# Patient Record
Sex: FEMALE | Race: WHITE | NOT HISPANIC OR LATINO | Employment: OTHER | URBAN - METROPOLITAN AREA
[De-identification: names, ages, dates, MRNs, and addresses within clinical notes are randomized per-mention and may not be internally consistent; named-entity substitution may affect disease eponyms.]

---

## 2023-09-08 PROBLEM — E53.8 VITAMIN B12 DEFICIENCY: Status: ACTIVE | Noted: 2023-09-08

## 2023-09-08 PROBLEM — E55.9 VITAMIN D DEFICIENCY: Status: ACTIVE | Noted: 2023-09-08

## 2023-09-08 PROBLEM — N82.3 RECTOVAGINAL FISTULA: Status: ACTIVE | Noted: 2023-09-08

## 2023-09-08 PROBLEM — K50.90 CROHN'S DISEASE (MULTI): Status: ACTIVE | Noted: 2023-09-08

## 2023-09-08 PROBLEM — B00.1 RECURRENT HERPES LABIALIS: Status: ACTIVE | Noted: 2023-09-08

## 2023-09-08 PROBLEM — B37.31 VAGINAL CANDIDIASIS: Status: ACTIVE | Noted: 2023-09-08

## 2023-09-08 PROBLEM — K43.9 ABDOMINAL WALL HERNIA: Status: ACTIVE | Noted: 2023-09-08

## 2023-09-08 RX ORDER — MULTIVITAMIN
1 TABLET ORAL DAILY
COMMUNITY
Start: 2017-02-01 | End: 2023-10-12 | Stop reason: WASHOUT

## 2023-09-08 RX ORDER — CARVEDILOL 12.5 MG/1
1 TABLET ORAL 2 TIMES DAILY
COMMUNITY
Start: 2017-02-01

## 2023-09-08 RX ORDER — FENOFIBRATE 160 MG/1
1 TABLET ORAL DAILY
COMMUNITY
End: 2023-10-12 | Stop reason: WASHOUT

## 2023-09-08 RX ORDER — CYANOCOBALAMIN 1000 UG/ML
1000 INJECTION, SOLUTION INTRAMUSCULAR; SUBCUTANEOUS
COMMUNITY
Start: 2014-10-08

## 2023-09-08 RX ORDER — LOPERAMIDE HCL 2 MG
TABLET ORAL
COMMUNITY
Start: 2015-04-15 | End: 2023-10-12 | Stop reason: WASHOUT

## 2023-09-08 RX ORDER — ALBUTEROL SULFATE 90 UG/1
AEROSOL, METERED RESPIRATORY (INHALATION)
COMMUNITY
Start: 2013-10-23 | End: 2023-10-12 | Stop reason: WASHOUT

## 2023-09-08 RX ORDER — POTASSIUM CHLORIDE 1125 MG/1
15 TABLET, EXTENDED RELEASE ORAL 2 TIMES DAILY
COMMUNITY
End: 2023-10-12 | Stop reason: WASHOUT

## 2023-09-08 RX ORDER — USTEKINUMAB 45 MG/.5ML
90 INJECTION, SOLUTION SUBCUTANEOUS
COMMUNITY
End: 2023-10-12 | Stop reason: WASHOUT

## 2023-09-08 RX ORDER — OXAZEPAM 15 MG/1
CAPSULE ORAL EVERY 24 HOURS
COMMUNITY
Start: 2015-04-17 | End: 2023-10-12 | Stop reason: WASHOUT

## 2023-09-08 RX ORDER — FAMCICLOVIR 250 MG/1
2 TABLET ORAL 3 TIMES DAILY
COMMUNITY
Start: 2020-06-02

## 2023-09-08 RX ORDER — TRAZODONE HYDROCHLORIDE 100 MG/1
TABLET ORAL
COMMUNITY
Start: 2015-04-17 | End: 2023-10-12 | Stop reason: WASHOUT

## 2023-09-08 RX ORDER — ALLOPURINOL 100 MG/1
TABLET ORAL
COMMUNITY
Start: 2019-03-09 | End: 2024-04-04

## 2023-09-08 RX ORDER — FLUCONAZOLE 200 MG/1
TABLET ORAL
COMMUNITY
Start: 2015-11-30

## 2023-09-08 RX ORDER — OXYBUTYNIN CHLORIDE 10 MG/1
1 TABLET, EXTENDED RELEASE ORAL DAILY
COMMUNITY
End: 2023-10-12 | Stop reason: WASHOUT

## 2023-09-08 RX ORDER — DONEPEZIL HYDROCHLORIDE 10 MG/1
1 TABLET, FILM COATED ORAL DAILY
COMMUNITY
End: 2023-10-12 | Stop reason: WASHOUT

## 2023-09-08 RX ORDER — SIMETHICONE 125 MG
CAPSULE ORAL 2 TIMES DAILY
COMMUNITY
End: 2023-10-12 | Stop reason: WASHOUT

## 2023-09-08 RX ORDER — MERCAPTOPURINE 50 MG/1
TABLET ORAL
COMMUNITY
Start: 2015-02-18

## 2023-09-08 RX ORDER — CHLORTHALIDONE 25 MG/1
1 TABLET ORAL DAILY
COMMUNITY
End: 2023-10-12 | Stop reason: WASHOUT

## 2023-09-08 RX ORDER — OMEPRAZOLE 40 MG/1
CAPSULE, DELAYED RELEASE ORAL
COMMUNITY
Start: 2013-10-23

## 2023-09-08 RX ORDER — FENOFIBRATE 134 MG/1
1 CAPSULE ORAL DAILY
COMMUNITY
Start: 2013-10-23 | End: 2023-10-12 | Stop reason: WASHOUT

## 2023-09-08 RX ORDER — RIZATRIPTAN BENZOATE 10 MG/1
TABLET ORAL
COMMUNITY
Start: 2013-10-23

## 2023-09-08 RX ORDER — BUPROPION HYDROCHLORIDE 300 MG/1
1 TABLET ORAL DAILY
COMMUNITY

## 2023-09-08 RX ORDER — DULOXETIN HYDROCHLORIDE 60 MG/1
CAPSULE, DELAYED RELEASE ORAL
COMMUNITY
Start: 2018-06-18 | End: 2023-10-12 | Stop reason: WASHOUT

## 2023-10-12 ENCOUNTER — OFFICE VISIT (OUTPATIENT)
Dept: GASTROENTEROLOGY | Facility: CLINIC | Age: 53
End: 2023-10-12
Payer: MEDICARE

## 2023-10-12 VITALS
HEIGHT: 67 IN | SYSTOLIC BLOOD PRESSURE: 125 MMHG | DIASTOLIC BLOOD PRESSURE: 83 MMHG | BODY MASS INDEX: 32.68 KG/M2 | HEART RATE: 71 BPM | TEMPERATURE: 97.3 F | WEIGHT: 208.2 LBS

## 2023-10-12 DIAGNOSIS — K50.813 CROHN'S DISEASE OF BOTH SMALL AND LARGE INTESTINE WITH FISTULA (MULTI): Primary | ICD-10-CM

## 2023-10-12 PROCEDURE — 99214 OFFICE O/P EST MOD 30 MIN: CPT | Performed by: INTERNAL MEDICINE

## 2023-10-12 RX ORDER — OXAZEPAM 10 MG/1
10 CAPSULE ORAL
COMMUNITY
Start: 2023-10-10

## 2023-10-12 ASSESSMENT — PAIN SCALES - GENERAL: PAINLEVEL: 0-NO PAIN

## 2023-10-12 NOTE — PROGRESS NOTES
"Follow-up for 53 year old woman with ileal, colonic, and perianal Crohn's disease diagnosed around age 20.     S/P 4 ileocolic resections for progressive obstructive disease 1989, 1995, 2001 and 8/2012.    Currently on Stelara (2/2018) and 6MP 50 mg/allopurinol 100 mg four days per week. Overall improved and generally well on therapy.     (+) h/o rectovaginal fistula in past s/p repair with c/o intermittent air per vagina. Presented early 3/2018 with abscess in RV septum--> EUA with I&D of abscess(Bartholin cyst); seton placed 3/8/2019.    7/2021 Anser UST 2.7 with no antibodies    10/2021 colonoscopy without any evident disease; anastomosis OK.  No colonic disease.  (+) chronic perianal fistula.      11/21 had ventral hernia repair with mesh placed at [facility]    10/2022.  some increased stress and diarrhea.  Stools were up to 4-6 daily from her baseline of 1-2 daily.  Stelara every 8 weeks and 6-mercaptopurine/allopurinol 4 days/wk with loperamide 2 tablets twice daily.    4/2023 FCP=66    In follow-up today, overall doing OK.  Occasionally if eats the wrong thing, peanuts for example, will get loose stool.  Most days stools 2-3 daily and softly formed.  Continues to note gas per fistula. If stool too loose, may have some discharge from fistula.    She is going to get pelvic floor physical therapy.  She has urinary incontinence and rare fecal urgency with some leaking.      Complete review of systems otherwise negative per complaint.  IBD EIMs (-)    /83   Pulse 71   Temp 36.3 °C (97.3 °F)   Ht 1.689 m (5' 6.5\")   Wt 94.4 kg (208 lb 3.2 oz)   BMI 33.10 kg/m²   Vital signs reviewed  Patient alert and oriented in no acute distress  Anicteric  No cervical adenopathy  Cardiac exam regular rate and rhythm S1-S2 without murmurs gallops or rubs  Lungs clear to auscultation bilaterally  Abdomen soft and nontender without organomegaly or mass.  No rebound or guarding.  Bowel sounds present  Extremities without " edema    A/P:  Crohn's disease-she has had ileal, colonic, and perianal fistulas and Crohn's disease.  Currently disease is stable on Stelara every 8 weeks along with 6-mercaptopurine/allopurinol 1 of each 4 days/week.  Labs are normal taken last week and she feels well with stable bowel habits.  We will continue with current treatment without change.  We will monitor labs every 6 months since they have been stable.  We will ask her to follow-up virtually in 6 months and follow-up in the office in 1 year    Rectovaginal fistula-this is chronic and mildly symptomatic intermittently, particular when stool is loose.  There is no medical therapy for healing this.  She is not interested in any surgical intervention at this time, but may consider this some point in the future.  If she wants to pursue this, she can follow-up with Dr. Pieter Hernandez.

## 2024-01-29 DIAGNOSIS — K50.813 CROHN'S DISEASE OF BOTH SMALL AND LARGE INTESTINE WITH FISTULA (MULTI): Primary | ICD-10-CM

## 2024-02-12 DIAGNOSIS — R30.0 DYSURIA: Primary | ICD-10-CM

## 2024-02-12 DIAGNOSIS — K50.813 CROHN'S DISEASE OF BOTH SMALL AND LARGE INTESTINE WITH FISTULA (MULTI): Primary | ICD-10-CM

## 2024-02-12 RX ORDER — USTEKINUMAB 90 MG/ML
90 INJECTION, SOLUTION SUBCUTANEOUS
COMMUNITY
Start: 2023-12-11 | End: 2024-02-12 | Stop reason: SDUPTHER

## 2024-02-12 RX ORDER — CIPROFLOXACIN 500 MG/1
500 TABLET ORAL 2 TIMES DAILY
Qty: 10 TABLET | Refills: 0 | Status: SHIPPED | OUTPATIENT
Start: 2024-02-12 | End: 2024-02-17

## 2024-02-12 RX ORDER — USTEKINUMAB 45 MG/.5ML
90 INJECTION, SOLUTION SUBCUTANEOUS
Qty: 1 ML | Refills: 5 | Status: SHIPPED | OUTPATIENT
Start: 2024-02-12 | End: 2024-02-14 | Stop reason: CLARIF

## 2024-02-12 NOTE — TELEPHONE ENCOUNTER
Ivanna called the office. Was treated with 2 antibiotics for pneumonia. This caused frequent loose stools and fistula drainage. Now has UTI symptoms due to drainage from fistula.     I discussed the patient with Dr. Ozuna. He developed the following plan.       THE PATIENT IS AWARE OF THE PLAN ABOVE AND IS IN AGREEMENT

## 2024-02-14 DIAGNOSIS — K50.813 CROHN'S DISEASE OF BOTH SMALL AND LARGE INTESTINE WITH FISTULA (MULTI): Primary | ICD-10-CM

## 2024-04-03 DIAGNOSIS — K50.813 CROHN'S DISEASE OF BOTH SMALL AND LARGE INTESTINE WITH FISTULA (MULTI): Primary | ICD-10-CM

## 2024-04-04 RX ORDER — ALLOPURINOL 100 MG/1
TABLET ORAL
Qty: 60 TABLET | Refills: 3 | Status: SHIPPED | OUTPATIENT
Start: 2024-04-04

## 2024-04-17 NOTE — PROGRESS NOTES
REASON FOR VISIT:  Crohn's disease    HPI:  Ivanna Jaimes is a 54 y.o. female who presents for virtual follow-up of Crohn's disease.  Ileal, colonic, and perianal Crohn's disease diagnosed around age 20.      S/P 4 ileocolic resections for progressive obstructive disease 1989, 1995, 2001 and 8/2012.     Currently on Stelara (2/2018) and 6MP 50 mg/allopurinol 100 mg four days per week. Overall improved and generally well on therapy.      (+) h/o rectovaginal fistula in past s/p repair with c/o intermittent air per vagina. Presented early 3/2018 with abscess in RV septum--> EUA with I&D of abscess(Bartholin cyst); seton placed 3/8/2019.     7/2021 Anser UST 2.7 with no antibodies     10/2021 colonoscopy without any evident disease; anastomosis OK.  No colonic disease.  (+) chronic perianal fistula.       11/21 had ventral hernia repair with mesh placed at [facility]     10/2022.  some increased stress and diarrhea.  Stools were up to 4-6 daily from her baseline of 1-2 daily.  Stelara every 8 weeks and 6-mercaptopurine/allopurinol 4 days/wk with loperamide 2 tablets twice daily.     4/2023 FCP=66     Last seen 10/2023 and overall doing OK.  Occasionally if eats the wrong thing, peanuts for example, will get loose stool.  Most days stools 2-3 daily and softly formed.  Continues to note gas per fistula. If stool too loose, may have some discharge from fistula.  She was going to do some pelvic floor physical therapy.  She has urinary incontinence and rare fecal urgency with some leaking.      In virtual follow-up today, She is overall doing well.  Stools are 3-4 daily.  She will have nocturnal stools 3-4 times per month.  She continues on Stelara every 8 weeks and allopurinol azathioprine 1 of each daily Monday through Thursday.    She is most bothered by intermittent drainage from her fistula.  She has known rectovaginal fistula.  She was recently on some oral antibiotics for?  Outpatient pneumonia?  And this is that her  stool is looser with some increased fistula output.  Most of her stools are formed to softly formed, but occasionally they are looser depending what she eats.  She does not feel unwell.  Appetite is good and weight is stable.    Her son just had a baby boy and this is the first available grandchild.  She has 3 other granddaughters.  Her  is doing okay status post transplant.  REVIEW OF SYSTEMS    Allergies   Allergen Reactions    Albuterol Other     increases heart rate, difficulty breathing    Cortisone Other     psychotic episode    Haloperidol Other     psychotic episode    Meperidine Itching    Morphine Itching    Prednisone Other     psychotic episode    Latex Itching and Rash       Past Medical History:   Diagnosis Date    Anal fistula     Fistula-in-ano    Crohn's disease of small intestine without complications (Multi)     Crohn's disease of ileum    Other conditions influencing health status     Rectovulvar Fistula    Personal history of other diseases of the nervous system and sense organs     History of carpal tunnel syndrome       Past Surgical History:   Procedure Laterality Date    COLECTOMY  09/10/2013    Partial Colectomy    EXPLORATORY LAPAROTOMY  09/10/2013    Exploratory Laparotomy    ILEOSTOMY  04/09/2014    Ileostomy    ILEOSTOMY CLOSURE  04/09/2014    Ileostomy Closure    OTHER SURGICAL HISTORY  09/10/2013    Anoscopy With Biopsy    OTHER SURGICAL HISTORY  09/10/2013    Proctosigmoidoscopy (Rigid - Diagnostic)    OTHER SURGICAL HISTORY  09/10/2013    Tot Proctocolect W/ Ileoanal Anast Loop Ileost Rect Mucosect    OTHER SURGICAL HISTORY  04/12/2022    Hernia repair    OTHER SURGICAL HISTORY  10/01/2019    Anal seton placement       Current Outpatient Medications   Medication Sig Dispense Refill    allopurinol (Zyloprim) 100 mg tablet TAKE ONE TABLET BY MOUTH DAILY, MONDAY THROUGH THURSDAY 60 tablet 3    buPROPion XL (Wellbutrin XL) 300 mg 24 hr tablet Take 1 tablet (300 mg) by mouth once  "daily.      carvedilol (Coreg) 12.5 mg tablet Take 1 tablet (12.5 mg) by mouth 2 times a day.      cyanocobalamin (Vitamin B-12) 1,000 mcg/mL injection Inject 1 mL (1,000 mcg) into the shoulder, thigh, or buttocks every 30 (thirty) days.      famciclovir (Famvir) 250 mg tablet Take 2 tablets (500 mg) by mouth 3 times a day.      fluconazole (Diflucan) 200 mg tablet Take by mouth.      mercaptopurine (Purinethol) 50 mg tablet Take by mouth.      omeprazole (PriLOSEC) 40 mg DR capsule Take by mouth.      oxazepam (Serax) 10 mg capsule Take 1 capsule (10 mg) by mouth.      rizatriptan (Maxalt) 10 mg tablet Take by mouth.      traZODone (Desyrel) 75 MG split tablet Take by mouth once daily at bedtime.      ustekinumab (Stelara) injection Inject 1 mL (90 mg) under the skin 1 time for 1 dose. Every 8 weeks 1 mL 5     No current facility-administered medications for this visit.       PHYSICAL EXAM:  There were no vitals taken for this visit. This was a virtual visit and no physical examination was performed.  On video conferencing she was alert in no acute distress.      Lab Results   Component Value Date    WBC 6.4 03/08/2019    HGB 12.9 03/08/2019    HCT 36.9 03/08/2019    MCV 95 03/08/2019     03/08/2019     No results found for: \"ALT\", \"AST\", \"GGT\", \"ALKPHOS\", \"BILITOT\"    ASSESSMENT  #Crohn's disease-Overall, her Crohn's disease seems to be stable and she is doing really well.  We will go ahead and have her check a stool fecal calprotectin.  She will continue with Stelara every 8 weeks and azathioprine 50 mg/allopurinol 100 mg Monday through Thursday.  She is not due for surveillance colonoscopy for another couple years.  She will continue with lab monitoring.  She had labs that look good recently with her primary care physician.  Will repeat an    # Rectovaginal fistula with drainage intermittently-this is a chronic stable process, but would not affect her quality of life.  I recommended that she follow-up " with Dr. Karen Hernandez for reevaluation and consideration of exam under anesthesia possible seton.    PLAN  1) check stool fecal calprotectin  2) check labs every 6 months  3) continue Stelara every 8 weeks  4) continue azathioprine 50 mg daily for now 100 mg daily Monday through Thursday  5) I suggest that you set up a follow-up appointment with Dr. Karen Hernandez for evaluation of your rectovaginal fistula  6) as long as you are feeling well, follow-up in the office in 6 months  7) call the office with any worsening symptoms, questions or concerns

## 2024-04-18 ENCOUNTER — OFFICE VISIT (OUTPATIENT)
Dept: GASTROENTEROLOGY | Facility: CLINIC | Age: 54
End: 2024-04-18
Payer: MEDICARE

## 2024-04-18 DIAGNOSIS — K50.813 CROHN'S DISEASE OF BOTH SMALL AND LARGE INTESTINE WITH FISTULA (MULTI): Primary | ICD-10-CM

## 2024-04-18 PROCEDURE — 99213 OFFICE O/P EST LOW 20 MIN: CPT | Performed by: INTERNAL MEDICINE

## 2024-04-18 NOTE — PATIENT INSTRUCTIONS
Thank you for speaking with me today for a virtual telehealth visit.  I am glad that, overall, you are doing well.  As we discussed, I would like you to check a stool fecal calprotectin to make sure that your disease remains under control.  Her last quite good a year.  I think might be worth her seeing Dr. Stapleton again again for the fistula.  As reviewed today:    1) check stool fecal calprotectin  2) check labs every 6 months  3) continue Stelara every 8 weeks  4) continue azathioprine 50 mg daily for now 100 mg daily Monday through Thursday  5) I suggest that you set up a follow-up appointment with Dr. Karen Hernandez for evaluation of your rectovaginal fistula  6) as long as you are feeling well, follow-up in the office in 6 months  7) call the office with any worsening symptoms, questions or concerns

## 2024-06-18 DIAGNOSIS — K50.813 CROHN'S DISEASE OF BOTH SMALL AND LARGE INTESTINE WITH FISTULA (MULTI): Primary | ICD-10-CM

## 2024-06-18 RX ORDER — CIPROFLOXACIN 500 MG/1
500 TABLET ORAL 2 TIMES DAILY
Qty: 10 TABLET | Refills: 0 | Status: SHIPPED | OUTPATIENT
Start: 2024-06-18 | End: 2024-06-23

## 2024-07-18 ENCOUNTER — TELEPHONE (OUTPATIENT)
Dept: GASTROENTEROLOGY | Facility: HOSPITAL | Age: 54
End: 2024-07-18
Payer: MEDICARE

## 2024-07-18 NOTE — TELEPHONE ENCOUNTER
Fecal calprotectin at outside lab less than 50 and in the normal range.  Results communicated to patient.

## 2024-07-24 DIAGNOSIS — B37.31 VAGINA, CANDIDIASIS: ICD-10-CM

## 2024-07-24 DIAGNOSIS — K50.813 CROHN'S DISEASE OF BOTH SMALL AND LARGE INTESTINE WITH FISTULA (MULTI): Primary | ICD-10-CM

## 2024-07-24 DIAGNOSIS — B00.1 HERPES LABIALIS WITHOUT COMPLICATION: ICD-10-CM

## 2024-07-24 RX ORDER — FAMCICLOVIR 250 MG/1
500 TABLET ORAL 3 TIMES DAILY
Qty: 540 TABLET | Refills: 0 | Status: SHIPPED | OUTPATIENT
Start: 2024-07-24 | End: 2024-07-24 | Stop reason: SDUPTHER

## 2024-07-24 RX ORDER — FAMCICLOVIR 250 MG/1
500 TABLET ORAL 3 TIMES DAILY
Qty: 60 TABLET | Refills: 2 | Status: SHIPPED | OUTPATIENT
Start: 2024-07-24 | End: 2024-08-03

## 2024-07-24 RX ORDER — FLUCONAZOLE 200 MG/1
200 TABLET ORAL DAILY
Qty: 7 TABLET | Refills: 1 | Status: SHIPPED | OUTPATIENT
Start: 2024-07-24

## 2024-07-24 RX ORDER — ALLOPURINOL 100 MG/1
100 TABLET ORAL
Qty: 48 TABLET | Refills: 1 | Status: SHIPPED | OUTPATIENT
Start: 2024-07-25

## 2024-07-24 RX ORDER — MERCAPTOPURINE 50 MG/1
50 TABLET ORAL
Qty: 60 TABLET | Refills: 3 | Status: SHIPPED | OUTPATIENT
Start: 2024-07-25 | End: 2025-09-18

## 2024-08-12 DIAGNOSIS — E53.8 VITAMIN B12 DEFICIENCY: Primary | ICD-10-CM

## 2024-08-12 RX ORDER — CYANOCOBALAMIN 1000 UG/ML
1000 INJECTION, SOLUTION INTRAMUSCULAR; SUBCUTANEOUS
Qty: 1 ML | Refills: 5 | Status: SHIPPED | OUTPATIENT
Start: 2024-08-12

## 2024-08-12 RX ORDER — SYRINGE,SAFETY WITH NEEDLE,3ML 21 G X 1"
1 SYRINGE, EMPTY DISPOSABLE MISCELLANEOUS WEEKLY
Qty: 11 EACH | Refills: 0 | Status: SHIPPED | OUTPATIENT
Start: 2024-08-12

## 2024-08-13 ENCOUNTER — DOCUMENTATION (OUTPATIENT)
Dept: GASTROENTEROLOGY | Facility: HOSPITAL | Age: 54
End: 2024-08-13
Payer: MEDICARE

## 2024-08-13 DIAGNOSIS — R30.0 DYSURIA: Primary | ICD-10-CM

## 2024-08-13 RX ORDER — CIPROFLOXACIN 500 MG/1
500 TABLET ORAL 2 TIMES DAILY
Qty: 14 TABLET | Refills: 0 | Status: SHIPPED | OUTPATIENT
Start: 2024-08-13 | End: 2024-08-20

## 2024-08-13 NOTE — PROGRESS NOTES
Patient complaining of cloudy urine, back/flank pain, and fatigue.  She feels like she has a UTI.  We will send her for urinalysis once this is turned and started on ciprofloxacin 500 mg twice daily.  We will do this for 7 days given her immunosuppressed status

## 2024-10-06 DIAGNOSIS — K50.813 CROHN'S DISEASE OF BOTH SMALL AND LARGE INTESTINE WITH FISTULA (MULTI): Primary | ICD-10-CM

## 2024-10-07 RX ORDER — SYRINGE WITH NEEDLE, 1 ML 25GX5/8"
SYRINGE, EMPTY DISPOSABLE MISCELLANEOUS
Qty: 12 EACH | Refills: 1 | Status: SHIPPED | OUTPATIENT
Start: 2024-10-07

## 2024-12-12 ENCOUNTER — TELEPHONE (OUTPATIENT)
Dept: GASTROENTEROLOGY | Facility: CLINIC | Age: 54
End: 2024-12-12
Payer: MEDICARE

## 2024-12-12 DIAGNOSIS — R30.0 DYSURIA: Primary | ICD-10-CM

## 2024-12-12 RX ORDER — CIPROFLOXACIN 500 MG/1
500 TABLET ORAL 2 TIMES DAILY
Qty: 14 TABLET | Refills: 0 | Status: SHIPPED | OUTPATIENT
Start: 2024-12-12 | End: 2024-12-19

## 2024-12-12 NOTE — TELEPHONE ENCOUNTER
In the past was treated with Cipro 500 mg one twice a day for 7 days.     Will reorder pending Dr. Ozuna' signature.     THE PATIENT IS AWARE OF THE PLAN ABOVE AND IS IN AGREEMENT

## 2025-02-18 DIAGNOSIS — K50.813 CROHN'S DISEASE OF BOTH SMALL AND LARGE INTESTINE WITH FISTULA (MULTI): ICD-10-CM

## 2025-02-19 RX ORDER — USTEKINUMAB 90 MG/ML
INJECTION, SOLUTION SUBCUTANEOUS
Qty: 1 ML | Refills: 4 | Status: SHIPPED | OUTPATIENT
Start: 2025-02-19

## 2025-03-28 ENCOUNTER — TELEPHONE (OUTPATIENT)
Dept: GASTROENTEROLOGY | Facility: CLINIC | Age: 55
End: 2025-03-28
Payer: MEDICARE

## 2025-03-28 DIAGNOSIS — R30.0 DYSURIA: Primary | ICD-10-CM

## 2025-03-28 RX ORDER — CIPROFLOXACIN 500 MG/1
500 TABLET ORAL 2 TIMES DAILY
Qty: 10 TABLET | Refills: 0 | Status: SHIPPED | OUTPATIENT
Start: 2025-03-28 | End: 2025-04-02

## 2025-04-07 DIAGNOSIS — R30.0 DYSURIA: ICD-10-CM

## 2025-04-07 RX ORDER — CIPROFLOXACIN 500 MG/1
500 TABLET ORAL 2 TIMES DAILY
Qty: 10 TABLET | Refills: 0 | Status: SHIPPED | OUTPATIENT
Start: 2025-04-07 | End: 2025-04-12

## 2025-04-07 NOTE — TELEPHONE ENCOUNTER
Treated for UTI symptoms of cloudy urine and urinary frquency for 5 days  with Cipro. Symptoms resolved but have returned. Asking for additional days of antibiotic    Sent to Dr. Ozuna for review       THE PATIENT IS AWARE OF THE PLAN ABOVE AND IS IN AGREEMENT

## 2025-04-14 DIAGNOSIS — K50.813 CROHN'S DISEASE OF BOTH SMALL AND LARGE INTESTINE WITH FISTULA (MULTI): ICD-10-CM

## 2025-04-15 RX ORDER — ALLOPURINOL 100 MG/1
TABLET ORAL
Qty: 52 TABLET | Refills: 2 | Status: SHIPPED | OUTPATIENT
Start: 2025-04-15

## 2025-05-08 DIAGNOSIS — K50.819 CROHN'S DISEASE OF SMALL AND LARGE INTESTINES WITH COMPLICATION (MULTI): ICD-10-CM

## 2025-05-13 DIAGNOSIS — K50.819 CROHN'S DISEASE OF SMALL AND LARGE INTESTINES WITH COMPLICATION (MULTI): ICD-10-CM

## 2025-05-28 DIAGNOSIS — E53.8 VITAMIN B12 DEFICIENCY: ICD-10-CM

## 2025-05-28 RX ORDER — CYANOCOBALAMIN 1000 UG/ML
1000 INJECTION, SOLUTION INTRAMUSCULAR; SUBCUTANEOUS
Qty: 1 ML | Refills: 5 | Status: SHIPPED | OUTPATIENT
Start: 2025-05-28

## 2025-08-01 DIAGNOSIS — K50.819 CROHN'S DISEASE OF SMALL AND LARGE INTESTINES WITH COMPLICATION (MULTI): ICD-10-CM
